# Patient Record
Sex: FEMALE | Race: ASIAN
[De-identification: names, ages, dates, MRNs, and addresses within clinical notes are randomized per-mention and may not be internally consistent; named-entity substitution may affect disease eponyms.]

---

## 2021-10-04 ENCOUNTER — HOSPITAL ENCOUNTER (EMERGENCY)
Dept: HOSPITAL 63 - ER | Age: 56
LOS: 1 days | Discharge: HOME | End: 2021-10-05
Payer: COMMERCIAL

## 2021-10-04 VITALS — HEIGHT: 63 IN | WEIGHT: 136.69 LBS | BODY MASS INDEX: 24.22 KG/M2

## 2021-10-04 DIAGNOSIS — K29.00: Primary | ICD-10-CM

## 2021-10-04 PROCEDURE — 99283 EMERGENCY DEPT VISIT LOW MDM: CPT

## 2021-10-04 PROCEDURE — 96374 THER/PROPH/DIAG INJ IV PUSH: CPT

## 2021-10-04 PROCEDURE — 81025 URINE PREGNANCY TEST: CPT

## 2021-10-04 PROCEDURE — 96361 HYDRATE IV INFUSION ADD-ON: CPT

## 2021-10-04 PROCEDURE — 80053 COMPREHEN METABOLIC PANEL: CPT

## 2021-10-04 PROCEDURE — 36415 COLL VENOUS BLD VENIPUNCTURE: CPT

## 2021-10-04 PROCEDURE — 81001 URINALYSIS AUTO W/SCOPE: CPT

## 2021-10-04 PROCEDURE — 85025 COMPLETE CBC W/AUTO DIFF WBC: CPT

## 2021-10-04 NOTE — PHYS DOC
Adult General


Chief Complaint


Chief Complaint:  Vomiting





HPI


HPI





Patient is a 56 year old female who presents with nausea and vomiting starting 

approximately 6 PM this evening.  Patient states that at work she had lunch with

some noodles and then a coffee later on in the day with old milk from the 

refrigerator.  She thinks that after she got home, she started having some 

abdominal cramps and then started having nausea vomiting.  She has some 

epigastric discomfort but otherwise has no significant abdominal pain.  Denies 

any back pain.  Denies any dysuria or diarrhea.  Denies cough, fever, chest pain

or headache.  Until this afternoon, she was completely asymptomatic.  She has no

further complaints.





Review of Systems


Review of Systems





Constitutional: Denies fever or chills 


Eyes: Denies change in visual acuity, redness, or eye pain 


HENT: Denies nasal congestion or sore throat 


Respiratory: Denies cough or shortness of breath 


Cardiovascular: No additional information not addressed in HPI 


GI: As stated in HPI


: Denies dysuria or hematuria 


Musculoskeletal: Denies back pain or joint pain 


Integument: Denies rash or skin lesions 


Neurologic: Denies headache, focal weakness or sensory changes 


Endocrine: Denies polyuria or polydipsia 





All other systems were reviewed and found to be within normal limits, except as 

documented in this note.





Current Medications


Current Medications





Current Medications








 Medications


  (Trade)  Dose


 Ordered  Sig/[facility]  Start Time


 Stop Time Status Last Admin


Dose Admin


 


 Ondansetron HCl


  (Zofran)  4 mg  1X  ONCE  10/4/21 23:45


 10/4/21 23:46 UNV  





 


 Sodium Chloride  1,000 ml @ 


 1,000 mls/hr  1X  ONCE  10/4/21 23:45


 10/5/21 00:44 UNV  














Physical Exam


Physical Exam





Constitutional: Well developed, well nourished, no acute distress, non-toxic 

appearance. 


HENT: Normocephalic, atraumatic, bilateral external ears normal, oropharynx 

moist, no oral exudates, nose normal. 


Eyes: PERRLA, EOMI, conjunctiva normal, no discharge.  


Neck: Normal range of motion, no tenderness, supple, no stridor.  


Cardiovascular:Heart rate regular rhythm, no murmur 


Lungs & Thorax:  Bilateral breath sounds clear to auscultation 


Abdomen: Bowel sounds normal, soft, only minimal epigastric tenderness but none 

in other 4 quadrants, no masses, no pulsatile masses.  


Skin: Warm, dry, no erythema, no rash.  


Back: No tenderness, no CVA tenderness.  


Extremities: No tenderness, no cyanosis, no clubbing, ROM intact, no edema.  


Neurologic: Alert and oriented X 3, normal motor function, normal sensory 

function, no focal deficits noted. 


Psychologic: Affect normal, judgement normal, mood normal.





Current Patient Data


Vital Signs


No significant abnormalities


Lab Results





Laboratory Tests








Test


 10/5/21


00:06 10/5/21


00:53 10/5/21


01:11


 


White Blood Count 4.6 x10^3/uL   


 


Red Blood Count 4.42 x10^6/uL   


 


Hemoglobin 12.8 g/dL   


 


Hematocrit 39.2 %   


 


Mean Corpuscular Volume 89 fL   


 


Mean Corpuscular Hemoglobin 29 pg   


 


Mean Corpuscular Hemoglobin


Concent 33 g/dL 


 


 





 


Red Cell Distribution Width 13.6 %   


 


Platelet Count 166 x10^3/uL   


 


Neutrophils (%) (Auto) 77 %   


 


Lymphocytes (%) (Auto) 17 %   


 


Monocytes (%) (Auto) 6 %   


 


Eosinophils (%) (Auto) 0 %   


 


Basophils (%) (Auto) 0 %   


 


Neutrophils # (Auto) 3.5 x10^3uL   


 


Lymphocytes # (Auto) 0.8 x10^3/uL   


 


Monocytes # (Auto) 0.3 x10^3/uL   


 


Eosinophils # (Auto) 0.0 x10^3/uL   


 


Basophils # (Auto) 0.0 x10^3/uL   


 


Sodium Level 140 mmol/L   


 


Potassium Level 3.7 mmol/L   


 


Chloride Level 101 mmol/L   


 


Carbon Dioxide Level 29 mmol/L   


 


Anion Gap 10   


 


Blood Urea Nitrogen 10 mg/dL   


 


Creatinine 0.4 mg/dL   


 


Estimated GFR


(Cockcroft-Gault) 166.3 


 


 





 


BUN/Creatinine Ratio 25   


 


Glucose Level 146 mg/dL   


 


Calcium Level 9.3 mg/dL   


 


Total Bilirubin 0.5 mg/dL   


 


Aspartate Amino Transf


(AST/SGOT) 18 U/L 


 


 





 


Alanine Aminotransferase


(ALT/SGPT) 34 U/L 


 


 





 


Alkaline Phosphatase 80 U/L   


 


Total Protein 7.1 g/dL   


 


Albumin 4.0 g/dL   


 


Albumin/Globulin Ratio 1.3   


 


Urine Collection Type  Unknown  


 


Urine Color  Yellow  


 


Urine Clarity  Hazy  


 


Urine pH  8.5  


 


Urine Specific Gravity  1.020  


 


Urine Protein  Neg  


 


Urine Glucose (UA)  Neg mg/dL  


 


Urine Ketones (Stick)  Neg mg/dL  


 


Urine Blood  Neg  


 


Urine Nitrite  Neg  


 


Urine Bilirubin  Neg  


 


Urine Urobilinogen Dipstick  0.2 mg/dL  


 


Urine Leukocyte Esterase  Neg  


 


Urine RBC  0 /HPF  


 


Urine WBC  Occ /HPF  


 


Urine Squamous Epithelial


Cells 


 Occ /LPF 


 





 


Urine Amorphous Sediment  Present /HPF  


 


Urine Bacteria  0 /HPF  


 


Bedside Urine HCG, Qualitative   hcg negative 








Current Medications








 Medications


  (Trade)  Dose


 Ordered  Sig/[facility]


 Route


 PRN Reason  Start Time


 Stop Time Status Last Admin


Dose Admin


 


 Sodium Chloride  1,000 ml @ 


 1,000 mls/hr  1X  ONCE


 IV


   10/4/21 23:45


 10/5/21 00:44 DC 10/5/21 00:16





 


 Ondansetron HCl


  (Zofran)  4 mg  1X  ONCE


 IVP


   10/4/21 23:45


 10/4/21 23:58 DC 10/5/21 00:16














EKG


EKG


[]





Radiology/Procedures


Radiology/Procedures


[]





Heart Score


C/O Chest Pain:  No


Risk Factors:


Risk Factors:  DM, Current or recent (<one month) smoker, HTN, HLP, family 

history of CAD, obesity.


Risk Scores:


Risk Factors:  DM, Current or recent (<one month) smoker, HTN, HLP, family 

history of CAD, obesity.





Course & Med Decision Making


Course & Med Decision Making


Pertinent Labs and Imaging studies reviewed. (See chart for details)





Patient had presented with vomiting otherwise was feeling well prior in the day.

  She had an IV established and normal saline 1 L bolus started along with 

Zofran.  Her laboratory studies were all unremarkable including CBC, 

comprehensive metabolic profile and urinalysis.  After liter of hydration and 

Zofran, patient felt significantly better and is able to tolerate diet.  She 

request discharge home.  Is likely that she could have food poisoning or just 

gastritis.  I have instructed her to take bland diet for the next 24 hours and 

keep yourself hydrated.  She will follow up with her physician if she has any 

recurrent symptoms or come back to the emergency department.





Dragon Disclaimer


Dragon Disclaimer


This electronic medical record was generated, in whole or in part, using a voice

 recognition dictation system.





Departure


Departure:


Impression:  


   Primary Impression:  


   Gastritis


   Ruled Out:  Vomiting


Disposition:  01 HOME / SELF CARE / HOMELESS


Condition:  IMPROVED


Referrals:  


DEV ADAMS MD (PCP)


Please see your doctor in 1 to 2 days and if you have recurrent symptoms or not 

tolerating diet, please come back to emergency department.


Patient Instructions:  Gastritis, Adult





Problem Qualifiers








   Primary Impression:  


   Gastritis


   Gastritis type:  unspecified gastritis  Chronicity:  acute  Gastritis 

   bleeding:  without bleeding  Qualified Codes:  K29.00 - Acute gastritis 

   without bleeding








RM SZYMANSKI MD            Oct 4, 2021 23:53

## 2021-10-05 VITALS — SYSTOLIC BLOOD PRESSURE: 133 MMHG | DIASTOLIC BLOOD PRESSURE: 77 MMHG

## 2021-10-05 LAB
ALBUMIN SERPL-MCNC: 4 G/DL (ref 3.4–5)
ALBUMIN/GLOB SERPL: 1.3 {RATIO} (ref 1–1.7)
ALP SERPL-CCNC: 80 U/L (ref 46–116)
ALT SERPL-CCNC: 34 U/L (ref 14–59)
AMORPH SED URNS QL MICRO: PRESENT /HPF
ANION GAP SERPL CALC-SCNC: 10 MMOL/L (ref 6–14)
APTT PPP: YELLOW S
AST SERPL-CCNC: 18 U/L (ref 15–37)
BACTERIA #/AREA URNS HPF: 0 /HPF
BASOPHILS # BLD AUTO: 0 X10^3/UL (ref 0–0.2)
BASOPHILS NFR BLD: 0 % (ref 0–3)
BILIRUB SERPL-MCNC: 0.5 MG/DL (ref 0.2–1)
BILIRUB UR QL STRIP: (no result)
BUN/CREAT SERPL: 25 (ref 6–20)
CA-I SERPL ISE-MCNC: 10 MG/DL (ref 7–20)
CALCIUM SERPL-MCNC: 9.3 MG/DL (ref 8.5–10.1)
CHLORIDE SERPL-SCNC: 101 MMOL/L (ref 98–107)
CO2 SERPL-SCNC: 29 MMOL/L (ref 21–32)
CREAT SERPL-MCNC: 0.4 MG/DL (ref 0.6–1)
EOSINOPHIL NFR BLD: 0 % (ref 0–3)
EOSINOPHIL NFR BLD: 0 X10^3/UL (ref 0–0.7)
ERYTHROCYTE [DISTWIDTH] IN BLOOD BY AUTOMATED COUNT: 13.6 % (ref 11.5–14.5)
FIBRINOGEN PPP-MCNC: (no result) MG/DL
GFR SERPLBLD BASED ON 1.73 SQ M-ARVRAT: 166.3 ML/MIN
GLOBULIN SER-MCNC: 3.1 G/DL (ref 2.2–3.8)
GLUCOSE SERPL-MCNC: 146 MG/DL (ref 70–99)
GLUCOSE UR STRIP-MCNC: (no result) MG/DL
HCT VFR BLD CALC: 39.2 % (ref 36–47)
HGB BLD-MCNC: 12.8 G/DL (ref 12–15.5)
LYMPHOCYTES # BLD: 0.8 X10^3/UL (ref 1–4.8)
LYMPHOCYTES NFR BLD AUTO: 17 % (ref 24–48)
MCH RBC QN AUTO: 29 PG (ref 25–35)
MCHC RBC AUTO-ENTMCNC: 33 G/DL (ref 31–37)
MCV RBC AUTO: 89 FL (ref 79–100)
MONO #: 0.3 X10^3/UL (ref 0–1.1)
MONOCYTES NFR BLD: 6 % (ref 0–9)
NEUT #: 3.5 X10^3UL (ref 1.8–7.7)
NEUTROPHILS NFR BLD AUTO: 77 % (ref 31–73)
NITRITE UR QL STRIP: (no result)
PLATELET # BLD AUTO: 166 X10^3/UL (ref 140–400)
POTASSIUM SERPL-SCNC: 3.7 MMOL/L (ref 3.5–5.1)
PROT SERPL-MCNC: 7.1 G/DL (ref 6.4–8.2)
RBC # BLD AUTO: 4.42 X10^6/UL (ref 3.5–5.4)
RBC #/AREA URNS HPF: 0 /HPF (ref 0–2)
SODIUM SERPL-SCNC: 140 MMOL/L (ref 136–145)
SP GR UR STRIP: 1.02
SQUAMOUS #/AREA URNS LPF: (no result) /LPF
UROBILINOGEN UR-MCNC: 0.2 MG/DL
WBC # BLD AUTO: 4.6 X10^3/UL (ref 4–11)
WBC #/AREA URNS HPF: (no result) /HPF (ref 0–4)